# Patient Record
Sex: MALE | Race: OTHER | Employment: FULL TIME | URBAN - METROPOLITAN AREA
[De-identification: names, ages, dates, MRNs, and addresses within clinical notes are randomized per-mention and may not be internally consistent; named-entity substitution may affect disease eponyms.]

---

## 2018-06-10 ENCOUNTER — HOSPITAL ENCOUNTER (INPATIENT)
Facility: HOSPITAL | Age: 53
LOS: 1 days | Discharge: LEFT BEFORE DISCHARGE | DRG: 309 | End: 2018-06-11
Attending: EMERGENCY MEDICINE | Admitting: INTERNAL MEDICINE

## 2018-06-10 DIAGNOSIS — I48.91 ATRIAL FIBRILLATION WITH RAPID VENTRICULAR RESPONSE (HCC): Primary | ICD-10-CM

## 2018-06-10 PROCEDURE — 96365 THER/PROPH/DIAG IV INF INIT: CPT

## 2018-06-10 PROCEDURE — 85730 THROMBOPLASTIN TIME PARTIAL: CPT | Performed by: EMERGENCY MEDICINE

## 2018-06-10 PROCEDURE — 80048 BASIC METABOLIC PNL TOTAL CA: CPT | Performed by: EMERGENCY MEDICINE

## 2018-06-10 PROCEDURE — 93005 ELECTROCARDIOGRAM TRACING: CPT

## 2018-06-10 PROCEDURE — 99285 EMERGENCY DEPT VISIT HI MDM: CPT

## 2018-06-10 PROCEDURE — 96375 TX/PRO/DX INJ NEW DRUG ADDON: CPT

## 2018-06-10 PROCEDURE — 84484 ASSAY OF TROPONIN QUANT: CPT | Performed by: EMERGENCY MEDICINE

## 2018-06-10 PROCEDURE — 93010 ELECTROCARDIOGRAM REPORT: CPT | Performed by: EMERGENCY MEDICINE

## 2018-06-10 PROCEDURE — 85025 COMPLETE CBC W/AUTO DIFF WBC: CPT | Performed by: EMERGENCY MEDICINE

## 2018-06-10 RX ORDER — DILTIAZEM HYDROCHLORIDE 5 MG/ML
15 INJECTION INTRAVENOUS ONCE
Status: COMPLETED | OUTPATIENT
Start: 2018-06-10 | End: 2018-06-10

## 2018-06-10 RX ORDER — DILTIAZEM HYDROCHLORIDE 5 MG/ML
INJECTION INTRAVENOUS
Status: COMPLETED
Start: 2018-06-10 | End: 2018-06-10

## 2018-06-11 ENCOUNTER — APPOINTMENT (OUTPATIENT)
Dept: GENERAL RADIOLOGY | Facility: HOSPITAL | Age: 53
DRG: 309 | End: 2018-06-11
Attending: EMERGENCY MEDICINE

## 2018-06-11 ENCOUNTER — APPOINTMENT (OUTPATIENT)
Dept: CV DIAGNOSTICS | Facility: HOSPITAL | Age: 53
DRG: 309 | End: 2018-06-11
Attending: HOSPITALIST

## 2018-06-11 ENCOUNTER — APPOINTMENT (OUTPATIENT)
Dept: CT IMAGING | Facility: HOSPITAL | Age: 53
DRG: 309 | End: 2018-06-11
Attending: INTERNAL MEDICINE

## 2018-06-11 VITALS
WEIGHT: 293.88 LBS | BODY MASS INDEX: 37.72 KG/M2 | HEART RATE: 78 BPM | SYSTOLIC BLOOD PRESSURE: 126 MMHG | TEMPERATURE: 98 F | HEIGHT: 74 IN | DIASTOLIC BLOOD PRESSURE: 80 MMHG | RESPIRATION RATE: 18 BRPM | OXYGEN SATURATION: 96 %

## 2018-06-11 PROBLEM — I48.91 ATRIAL FIBRILLATION WITH RAPID VENTRICULAR RESPONSE (HCC): Status: ACTIVE | Noted: 2018-06-11

## 2018-06-11 PROCEDURE — 85730 THROMBOPLASTIN TIME PARTIAL: CPT | Performed by: INTERNAL MEDICINE

## 2018-06-11 PROCEDURE — 71045 X-RAY EXAM CHEST 1 VIEW: CPT | Performed by: EMERGENCY MEDICINE

## 2018-06-11 PROCEDURE — 93306 TTE W/DOPPLER COMPLETE: CPT | Performed by: HOSPITALIST

## 2018-06-11 PROCEDURE — 71250 CT THORAX DX C-: CPT | Performed by: INTERNAL MEDICINE

## 2018-06-11 PROCEDURE — 84484 ASSAY OF TROPONIN QUANT: CPT | Performed by: HOSPITALIST

## 2018-06-11 RX ORDER — HEPARIN SODIUM AND DEXTROSE 10000; 5 [USP'U]/100ML; G/100ML
INJECTION INTRAVENOUS CONTINUOUS
Status: DISCONTINUED | OUTPATIENT
Start: 2018-06-11 | End: 2018-06-11

## 2018-06-11 RX ORDER — HEPARIN SODIUM AND DEXTROSE 10000; 5 [USP'U]/100ML; G/100ML
1000 INJECTION INTRAVENOUS ONCE
Status: COMPLETED | OUTPATIENT
Start: 2018-06-11 | End: 2018-06-11

## 2018-06-11 RX ORDER — POTASSIUM CHLORIDE 20 MEQ/1
40 TABLET, EXTENDED RELEASE ORAL ONCE
Status: DISCONTINUED | OUTPATIENT
Start: 2018-06-11 | End: 2018-06-11

## 2018-06-11 RX ORDER — HEPARIN SODIUM 1000 [USP'U]/ML
INJECTION, SOLUTION INTRAVENOUS; SUBCUTANEOUS
Status: DISCONTINUED
Start: 2018-06-11 | End: 2018-06-11

## 2018-06-11 RX ORDER — HEPARIN SODIUM 1000 [USP'U]/ML
5000 INJECTION, SOLUTION INTRAVENOUS; SUBCUTANEOUS ONCE
Status: COMPLETED | OUTPATIENT
Start: 2018-06-11 | End: 2018-06-11

## 2018-06-11 RX ORDER — HEPARIN SODIUM 1000 [USP'U]/ML
3000 INJECTION, SOLUTION INTRAVENOUS; SUBCUTANEOUS ONCE
Status: COMPLETED | OUTPATIENT
Start: 2018-06-11 | End: 2018-06-11

## 2018-06-11 NOTE — H&P
[facility]    PATIENT'S NAME: Leeanna Bustamante   ATTENDING PHYSICIAN: Matty Freedman MD   PATIENT ACCOUNT#:   [id number]    LOCATION:  [address] [place] [address] RECORD #:   [id number]       YOB: 1965  ADMISSION DATE:       06/10/ cardioversion and ablation in a.m.   4.   Nutrition: On cardiac diet. 5.   DVT prophylaxis:  On therapeutic dose of heparin.     Dictated By Kingsley Pfeiffer MD  d: 06/11/2018 15:35:56  t: 06/11/2018 15:57:04  [facility] [id number]  MM/

## 2018-06-11 NOTE — CONSULTS
[facility]    Report of Consultation    Denis Edwards Patient Status:  Inpatient    1965 MRN [id number]   Location [facility] 3W/SW Attending [address], [address] Day # 0 PCP No primary care provider on file. Scheduled Meds:   • heparin sodium           Continuous Infusions:   • Continuous dose Heparin infusion 1,200 Units/hr (06/11/18 0927)   • diltiazem 10 mg/hr (06/11/18 0122)       General appearance: alert, appears stated age and cooperative  Pulmonary are available, then a CT scan with contrast is advised. In addition, a followup chest PA and lateral is also advised. No acute airspace disease.      Dictated by (CST): Malu Ng MD on 6/11/2018 at 7:35     Approved by (CST): Malu Ng MD on 6/11 Advanced Heart Failure, Cardiac Transplant and Assisted Devices  [facility]  Pager: [phone]  Tel: [phone]

## 2018-06-11 NOTE — ED NOTES
Pt states he was just sitting on the couch and felt like his heart was going to jump out of his chest. Pt states he drinks alcohol daily. Denies chest pain. Denies SOB.

## 2018-06-11 NOTE — ED INITIAL ASSESSMENT (HPI)
Palpitations x1.5 hours. Denies chest pain, dizziness, N/V. Patient reports intermittent SOB, denies SOB at this time.

## 2018-06-11 NOTE — ED PROVIDER NOTES
Patient Seen in: [facility] AND [facility] Emergency Department    History   Patient presents with:  Arrythmia/Palpitations (cardiovascular)    Stated Complaint: irregular heartbeat     HPI    The patient is a 27-year-old male who presents with palpitations that Cardiovascular: Normal heart sounds and intact distal pulses. An irregularly irregular rhythm present. Tachycardia present. No murmur heard. Pulmonary/Chest: Effort normal and breath sounds normal.   Abdominal: Soft.  Bowel sounds are normal. He exhi disposition: 6/11/2018 12:44 AM        Pulse Ox: 95%, Normal, room air    Cardiac Monitor: Pulse Readings from Last 1 Encounters:  06/11/18 : 88  , atrial fibrillation, rate controlled    Radiology findings:    No acute findings    Radiology exams  Viewed

## 2018-06-12 NOTE — PLAN OF CARE
Pt called RN to discharge the patient. RN explain the process of discharge. Pt wanted to go AMA. Called Dr [address]a [address] and was reported.  is ok with pt going ama.  Dr [address] asked the RN to call cardiologist. Cardiologist Shree Evans was called and was notif

## 2018-06-13 ENCOUNTER — TELEPHONE (OUTPATIENT)
Dept: CARDIOLOGY UNIT | Facility: HOSPITAL | Age: 53
End: 2018-06-13

## 2018-07-06 ENCOUNTER — PRIOR ORIGINAL RECORDS (OUTPATIENT)
Dept: OTHER | Age: 53
End: 2018-07-06

## 2018-07-06 ENCOUNTER — MYAURORA ACCOUNT LINK (OUTPATIENT)
Dept: OTHER | Age: 53
End: 2018-07-06

## 2018-07-18 NOTE — DISCHARGE SUMMARY
[facility]    PATIENT'S NAME: Suresh Nesbitt   ATTENDING PHYSICIAN: Torrey Hernandes MD   PATIENT ACCOUNT#:   [id number]    LOCATION:  [address] [place] [address] RECORD #:   [id number]       YOB: 1965  ADMISSION DATE:       06/10/

## 2018-07-24 ENCOUNTER — PRIOR ORIGINAL RECORDS (OUTPATIENT)
Dept: OTHER | Age: 53
End: 2018-07-24

## 2018-07-27 ENCOUNTER — PRIOR ORIGINAL RECORDS (OUTPATIENT)
Dept: OTHER | Age: 53
End: 2018-07-27

## 2018-07-27 LAB
CHOLESTEROL, TOTAL: 186 MG/DL
GLUCOSE: 102 MG/DL
HDL CHOLESTEROL: 44 MG/DL
LDL CHOLESTEROL: 124 MG/DL
NON-HDL CHOLESTEROL: 142 MG/DL
THYROID STIMULATING HORMONE: 1.47 MLU/L
TRIGLYCERIDES: 91 MG/DL

## 2018-07-30 ENCOUNTER — PRIOR ORIGINAL RECORDS (OUTPATIENT)
Dept: OTHER | Age: 53
End: 2018-07-30

## 2019-02-28 VITALS
RESPIRATION RATE: 16 BRPM | SYSTOLIC BLOOD PRESSURE: 118 MMHG | DIASTOLIC BLOOD PRESSURE: 86 MMHG | WEIGHT: 164.24 LBS | HEART RATE: 76 BPM